# Patient Record
(demographics unavailable — no encounter records)

---

## 2024-11-05 NOTE — CARDIOLOGY SUMMARY
[___] : [unfilled] [LVEF ___%] : LVEF [unfilled]% [___] : [unfilled] [None] : no pulmonary hypertension [Normal] : normal LA size [Mild] : mild mitral regurgitation [de-identified] : November 10, 2021.  Normal sinus rhythm.  Nonspecific T wave changes. May 5, 2023.  Normal sinus rhythm poor R wave progression nonspecific T wave change may14 2024.  Normal sinus rhythm.  Poor R wave progression.  Nonspecific T wave changes. [de-identified] : September 24, 2020.  EF 65 to 70% mild mitral regurgitation.  LVOT mean gradient 8.  Mild tricuspid regurgitation.  RVSP 27 mmHg. November 7, 2023.  Hyperdynamic/preserved EF.  Grade 2 diastolic dysfunction though difficult evaluation in presence of technically difficult study.  Mild MR TR.  PASP 37 mmHg. [de-identified] : Medtronic dual-chamber pacemaker.  Evaluated 11/5/24 .  Normal functioning.  Brief episodes of atrial arrhythmias.  Asymptomatic.

## 2024-11-05 NOTE — HISTORY OF PRESENT ILLNESS
[FreeTextEntry1] : active medical problems: \par  [place] [facility] 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 She does have coronary calcification, and no significant pericardial thickening noted on the prior CT scan in December of 2017\par  \par  Subsequent evaluation, which included cardiac MRI.\par  Pulmonary function tests did not reveal any significant cardiopulmonary abnormality\par  She had multiple cardiovascular tests to look for etiologies in relation to restrictive physiology, which were not showing any significant abnormality.\par  Her BNP level was normal.\par  \par  Essential hypertension. Chronic ankle edema. Significant postural dizziness, which has gotten better with decreased dose of antihypertensive therapy.\par  \par  Atrial arrhythmia, sequential PACs, lasting for less than 3 seconds.  Brief episode of possible atrial fibrillation. There was a small supraventricular hernia. Sinus pauses. Status post dual-chamber permanent pacemaker 2019 now on anticoagulation.  No significant toxicity so far.\par  \par  Hyperlipidemia.\par  \par  Prior history of possible renal artery stenosis with intervention 10 years ago.\par  \par  \par

## 2024-11-05 NOTE — PROCEDURE
[No] : not [Pacemaker] : pacemaker [Lead Imp:  ___ohms] : lead impedance was [unfilled] ohms [Sensing Amplitude ___mv] : sensing amplitude was [unfilled] mv [___V @] : [unfilled] V [___ ms] : [unfilled] ms [de-identified] : Medtronic [de-identified] : Bianka JARVIS DR MRI W1DR01 [de-identified] : [id number] [de-identified] : 9-20-19 [de-identified] : AAIR-DDDR [de-identified] :  [de-identified] : Battery longevity is 8.7 years [de-identified] : AP 66.7%,  19.3%  HVR reviewed and appear to be 1:1 AV conduction, short duration, pt asymptomatic - on Eliquis and Metoprolol  Settings: Atrial Amplitude 1.75V (auto), pulse width 0.4 ms (auto), sensitivity 0.3 mV RV Amplitude 2V (auto), pulse width 0.4 ms (auto), sensitivity 1.2 mV  Patient will be traveling to [place] in January - requesting PPM interrogation and OV with RP in May  Discussed red flag symptoms, which would warrant sooner or emergent medical evaluation. Any questions and concerns were addressed and resolved. Discuss remote monitoring further at next office visit  Sincerely,  Monet Bess PA-C Patients history, testing and plan reviewed with supervising MD: Dr. Irineo Parker

## 2024-11-05 NOTE — ASSESSMENT
[FreeTextEntry1] : Reviewed on November 5, 2024. Labs from early part of 2024 had shown stable CBC BMP.  N-terminal proBNP 183

## 2024-11-05 NOTE — DISCUSSION/SUMMARY
[FreeTextEntry1] : 84-year-old female with above medical history an active medical problems as noted below #1 supraventricular tachycardia, paroxysmal atrial fibrillation, brief episodes.  Relatively asymptomatic.  pacemaker evaluation reviewed.  She has Medtronic dual-chamber. No syncopal event Eliquis 5 mg twice daily .  High risk medication.  No toxicity.  Risk benefits have been discussed at length. Aspirin was discontinued Watch for bleeding.   Continue beta-blockers. Labs ordered. #2 sleep apnea. Severe. Obstructive. CPAP used to be continued.  She feels well with that.  She needs to use it regularly. #3 hiatus hernia, small frequent meals. gradually increasing activity and exercise. She continued to remain fairly active.  avoid bending at the waist, standing for prolonged period of time. #4 Essential hypertension well controlled. No orthostasis.  Occasional mild postural dizziness.  No significant signs of volume overload.  No significant CKD.  She probably does have LV diastolic dysfunction with heart failure of preserved EF. Continue hydration.  And as needed furosemide 20 mg  for any worsening edema #5 Hyperlipidemia on statin therapy.  Following a low-cholesterol diet.  Labs stable.  Continue to follow-up. #6 possible Pericardial restriction.  Overall improved symptoms.  History of exertional shortness of breath, dizziness and fatigue.  status post cardiac cath with normal coronary arteries.  Last CT scan without significant evidence of pericardial disease on that CT scan. She has a preserved ejection fraction. And nonobstructive carotid atherosclerotic vascular disease, and no significant aneurysm on abdominal aortic ultrasound. Her bony function tests were normal. Her cardiac MRI did not reveal any significant structural cardiac disease, mild mitral regurgitation, but showed large hiatus hernia.   Red flag symptoms which would warrant sooner emergent evaluation reviewed with the patient.  Questions and concerns were addressed and answered.   All the above were at length reviewed. Answered all the questions. Thank you very much for this kind referral. Please do not hesitate to give me a call for any question. Part of this transcription was done with voice recognition software and phonetically similar errors are common. I apologize for that. Please donot hesitate to call for any questions due to above.  Sincerely, Irineo Parker MD,[facility]

## 2024-11-05 NOTE — PHYSICAL EXAM
[Normal S1, S2] : normal S1, S2 [Murmur] : murmur [Clear Lung Fields] : clear lung fields [Edema ___] : edema [unfilled] [Normal Speech] : normal speech [Alert and Oriented] : alert and oriented

## 2024-11-05 NOTE — PROCEDURE
[No] : not [Pacemaker] : pacemaker [Lead Imp:  ___ohms] : lead impedance was [unfilled] ohms [Sensing Amplitude ___mv] : sensing amplitude was [unfilled] mv [___V @] : [unfilled] V [___ ms] : [unfilled] ms [de-identified] : Medtronic [de-identified] : Bianka JARVIS DR MRI W1DR01 [de-identified] : [id number] [de-identified] : 9-20-19 [de-identified] : AAIR-DDDR [de-identified] :  [de-identified] : Battery longevity is 8.7 years [de-identified] : AP 66.7%,  19.3%  HVR reviewed and appear to be 1:1 AV conduction, short duration, pt asymptomatic - on Eliquis and Metoprolol  Settings: Atrial Amplitude 1.75V (auto), pulse width 0.4 ms (auto), sensitivity 0.3 mV RV Amplitude 2V (auto), pulse width 0.4 ms (auto), sensitivity 1.2 mV  Patient will be traveling to [place] in January - requesting PPM interrogation and OV with RP in May  Discussed red flag symptoms, which would warrant sooner or emergent medical evaluation. Any questions and concerns were addressed and resolved. Discuss remote monitoring further at next office visit  Sincerely,  Monet Bess PA-C Patients history, testing and plan reviewed with supervising MD: Dr. Irineo Parker

## 2024-11-05 NOTE — REVIEW OF SYSTEMS
[SOB] : shortness of breath [Dyspnea on exertion] : dyspnea during exertion [Lower Ext Edema] : lower extremity edema [Joint Pain] : joint pain [Joint Stiffness] : joint stiffness [Numbness (Hypoesthesia)] : numbness [Negative] : Heme/Lymph [FreeTextEntry2] : Obesity [FreeTextEntry5] : As per HPI [FreeTextEntry9] : As per HPI

## 2024-11-05 NOTE — REASON FOR VISIT
[Symptom and Test Evaluation] : symptom and test evaluation [Arrhythmia/ECG Abnorrmalities] : arrhythmia/ECG abnormalities [Hyperlipidemia] : hyperlipidemia [Hypertension] : hypertension [Other: ____] : [unfilled] [Family Member] : family member [FreeTextEntry3] : Dr. Regalado [FreeTextEntry1] : 84-year-old is seen in the office for clinical follow-up.  Overall she has been doing fairly well. dyspnea on exertion.  Mild.  Usually in cold and warm environment.  She is still being able to play 18 holes of golf without any significant problems.  She has problems with the hand.   and possible tendon entrapment.  Mild intermittent ecchymosis No chest pain.  No PND orthopnea.  No significant worsening of her shortness of breath.  Exertional shortness of breath has remained stable.  Activity level is slightly limited because of the knee surgery and arthritis.  She has been stable with the use of CPAP. No further episode of epistaxis No recent hospital admission.

## 2025-04-02 NOTE — HISTORY OF PRESENT ILLNESS
[FreeTextEntry1] : active medical problems: \par  cardiac cath with Dr. Hoyos at [facility] [place]. 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 She does have coronary calcification, and no significant pericardial thickening noted on the prior CT scan in December of 2017\par  \par  Subsequent evaluation, which included cardiac MRI.\par  Pulmonary function tests did not reveal any significant cardiopulmonary abnormality\par  She had multiple cardiovascular tests to look for etiologies in relation to restrictive physiology, which were not showing any significant abnormality.\par  Her BNP level was normal.\par  \par  Essential hypertension. Chronic ankle edema. Significant postural dizziness, which has gotten better with decreased dose of antihypertensive therapy.\par  \par  Atrial arrhythmia, sequential PACs, lasting for less than 3 seconds.  Brief episode of possible atrial fibrillation. There was a small supraventricular hernia. Sinus pauses. Status post dual-chamber permanent pacemaker 2019 now on anticoagulation.  No significant toxicity so far.\par  \par  Hyperlipidemia.\par  \par  Prior history of possible renal artery stenosis with intervention 10 years ago.\par  \par  \par

## 2025-04-02 NOTE — REASON FOR VISIT
[Symptom and Test Evaluation] : symptom and test evaluation [Arrhythmia/ECG Abnorrmalities] : arrhythmia/ECG abnormalities [Hyperlipidemia] : hyperlipidemia [Hypertension] : hypertension [Other: ____] : [unfilled] [Family Member] : family member [FreeTextEntry3] : Dr. Regalado [FreeTextEntry1] : 84-year-old is seen in the office for clinical follow-up.  Overall she has been doing fairly well. dyspnea on exertion.  Mild.  Usually in cold and warm environment.  She is still being able to play 18 holes of golf without any significant problems.  She has problems with the hand.   and possible tendon entrapment.  Mild intermittent ecchymosis No chest pain.  No PND orthopnea.  No significant worsening of her shortness of breath.  Exertional shortness of breath has remained stable.  Activity level is slightly limited because of the knee surgery and arthritis.  She has been stable with the use of CPAP. No further episode of epistaxis No recent hospital admission.  [Follow-up Device Check] : is here today for a follow-up device check visit for

## 2025-04-02 NOTE — DISCUSSION/SUMMARY
[FreeTextEntry1] : ALICIA BRITTON  is a 84 year F  who presents today Apr 02, 2025 with the above history and the following active issues.   supraventricular tachycardia, paroxysmal atrial fibrillation, brief episodes.  Relatively asymptomatic.  pacemaker evaluation reviewed.  She has Medtronic dual-chamber. No syncopal event Eliquis 5 mg twice daily .  High risk medication.  No toxicity.  Risk benefits have been discussed at length. Aspirin was discontinued Watch for bleeding.   Continue beta-blockers.  Sleep apnea. Severe. Obstructive. CPAP used to be continued.  She feels well with that.  She needs to use it regularly.   Essential hypertension well controlled. No orthostasis.  Occasional mild postural dizziness.  No significant signs of volume overload.  No significant CKD.  She probably does have LV diastolic dysfunction with heart failure of preserved EF. Continue hydration.  And as needed furosemide 20 mg  for any worsening edema  Hyperlipidemia on statin therapy.  Following a low-cholesterol diet.  Labs stable.  Continue to follow-up.  Red flag symptoms which would warrant sooner emergent evaluation reviewed with the patient.  Questions and concerns were addressed and answered.

## 2025-04-02 NOTE — PROCEDURE
[No] : not [Pacemaker] : pacemaker [Lead Imp:  ___ohms] : lead impedance was [unfilled] ohms [Sensing Amplitude ___mv] : sensing amplitude was [unfilled] mv [___V @] : [unfilled] V [___ ms] : [unfilled] ms [de-identified] : Medtronic [de-identified] : Bianka JARVIS DR MRI W1DR01 [de-identified] : [id number] [de-identified] : 9-20-19 [de-identified] : AAIR-DDDR [de-identified] :  [de-identified] : Battery longevity is 8.7 years [de-identified] : AP 58.4%,  16.7%  HVR reviewed and appear to be 1:1 AV conduction, short duration, pt asymptomatic - on Eliquis and Metoprolol  Settings: Atrial Amplitude 1.5V (auto), pulse width 0.4 ms (auto), sensitivity 0.3 mV RV Amplitude 2V (auto), pulse width 0.4 ms (auto), sensitivity 1.2 mV  Several weeks ago sustained an episode of "falling ?passing out". Initially did not seek medical attention. Then due to not feeling well with HA and brain fog she was evaluated in ER. As per pt testing was normal and no acute findings. Saw PCP yesterday who started a course of abx for sinus infection. Awaiting neurology evaluation.   Discussed red flag symptoms, which would warrant sooner or emergent medical evaluation. Any questions and concerns were addressed and resolved. Clinical follow up with Dr. Parker  in 1 month Recommend she follow with neurology and pulmonary  Sincerely,  Monet Bess PA-C Patients history, testing and plan reviewed with supervising MD: Dr. Valentino  Sincerely,  Monet Bess PA-C Patients history, testing and plan reviewed with supervising MD: Dr. Irineo Parker

## 2025-04-02 NOTE — HISTORY OF PRESENT ILLNESS
[FreeTextEntry1] : active medical problems: \par  [place] [facility] 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 She does have coronary calcification, and no significant pericardial thickening noted on the prior CT scan in December of 2017\par  \par  Subsequent evaluation, which included cardiac MRI.\par  Pulmonary function tests did not reveal any significant cardiopulmonary abnormality\par  She had multiple cardiovascular tests to look for etiologies in relation to restrictive physiology, which were not showing any significant abnormality.\par  Her BNP level was normal.\par  \par  Essential hypertension. Chronic ankle edema. Significant postural dizziness, which has gotten better with decreased dose of antihypertensive therapy.\par  \par  Atrial arrhythmia, sequential PACs, lasting for less than 3 seconds.  Brief episode of possible atrial fibrillation. There was a small supraventricular hernia. Sinus pauses. Status post dual-chamber permanent pacemaker 2019 now on anticoagulation.  No significant toxicity so far.\par  \par  Hyperlipidemia.\par  \par  Prior history of possible renal artery stenosis with intervention 10 years ago.\par  \par  \par

## 2025-04-02 NOTE — PROCEDURE
[No] : not [Pacemaker] : pacemaker [Lead Imp:  ___ohms] : lead impedance was [unfilled] ohms [Sensing Amplitude ___mv] : sensing amplitude was [unfilled] mv [___V @] : [unfilled] V [___ ms] : [unfilled] ms [de-identified] : Medtronic [de-identified] : Bianka JARVIS DR MRI W1DR01 [de-identified] : [id number] [de-identified] : 9-20-19 [de-identified] : AAIR-DDDR [de-identified] :  [de-identified] : Battery longevity is 8.7 years [de-identified] : AP 58.4%,  16.7%  HVR reviewed and appear to be 1:1 AV conduction, short duration, pt asymptomatic - on Eliquis and Metoprolol  Settings: Atrial Amplitude 1.5V (auto), pulse width 0.4 ms (auto), sensitivity 0.3 mV RV Amplitude 2V (auto), pulse width 0.4 ms (auto), sensitivity 1.2 mV  Several weeks ago sustained an episode of "falling ?passing out". Initially did not seek medical attention. Then due to not feeling well with HA and brain fog she was evaluated in ER. As per pt testing was normal and no acute findings. Saw PCP yesterday who started a course of abx for sinus infection. Awaiting neurology evaluation.   Discussed red flag symptoms, which would warrant sooner or emergent medical evaluation. Any questions and concerns were addressed and resolved. Clinical follow up with Dr. Parker  in 1 month Recommend she follow with neurology and pulmonary  Sincerely,  Monet Bess PA-C Patients history, testing and plan reviewed with supervising MD: Dr. Valentino  Sincerely,  Monet Bess PA-C Patients history, testing and plan reviewed with supervising MD: Dr. Irineo Parker

## 2025-04-02 NOTE — CARDIOLOGY SUMMARY
[___] : [unfilled] [LVEF ___%] : LVEF [unfilled]% [None] : no pulmonary hypertension [Normal] : normal LA size [Mild] : mild mitral regurgitation [de-identified] : November 10, 2021.  Normal sinus rhythm.  Nonspecific T wave changes. May 5, 2023.  Normal sinus rhythm poor R wave progression nonspecific T wave change may14 2024.  Normal sinus rhythm.  Poor R wave progression.  Nonspecific T wave changes. [de-identified] : September 24, 2020.  EF 65 to 70% mild mitral regurgitation.  LVOT mean gradient 8.  Mild tricuspid regurgitation.  RVSP 27 mmHg. November 7, 2023.  Hyperdynamic/preserved EF.  Grade 2 diastolic dysfunction though difficult evaluation in presence of technically difficult study.  Mild MR TR.  PASP 37 mmHg. [de-identified] : Medtronic dual-chamber pacemaker.  Evaluated 11/5/24 .  Normal functioning.  Brief episodes of atrial arrhythmias.  Asymptomatic.

## 2025-04-02 NOTE — CARDIOLOGY SUMMARY
[___] : [unfilled] [LVEF ___%] : LVEF [unfilled]% [None] : no pulmonary hypertension [Normal] : normal LA size [Mild] : mild mitral regurgitation [de-identified] : November 10, 2021.  Normal sinus rhythm.  Nonspecific T wave changes. May 5, 2023.  Normal sinus rhythm poor R wave progression nonspecific T wave change may14 2024.  Normal sinus rhythm.  Poor R wave progression.  Nonspecific T wave changes. [de-identified] : September 24, 2020.  EF 65 to 70% mild mitral regurgitation.  LVOT mean gradient 8.  Mild tricuspid regurgitation.  RVSP 27 mmHg. November 7, 2023.  Hyperdynamic/preserved EF.  Grade 2 diastolic dysfunction though difficult evaluation in presence of technically difficult study.  Mild MR TR.  PASP 37 mmHg. [de-identified] : Medtronic dual-chamber pacemaker.  Evaluated 11/5/24 .  Normal functioning.  Brief episodes of atrial arrhythmias.  Asymptomatic.

## 2025-06-09 NOTE — CARDIOLOGY SUMMARY
[___] : [unfilled] [LVEF ___%] : LVEF [unfilled]% [None] : no pulmonary hypertension [Normal] : normal LA size [Mild] : mild mitral regurgitation [de-identified] : November 10, 2021.  Normal sinus rhythm.  Nonspecific T wave changes. May 5, 2023.  Normal sinus rhythm poor R wave progression nonspecific T wave change may14 2024.  Normal sinus rhythm.  Poor R wave progression.  Nonspecific T wave changes. 6/9/25 nsr  [de-identified] : September 24, 2020.  EF 65 to 70% mild mitral regurgitation.  LVOT mean gradient 8.  Mild tricuspid regurgitation.  RVSP 27 mmHg. November 7, 2023.  Hyperdynamic/preserved EF.  Grade 2 diastolic dysfunction though difficult evaluation in presence of technically difficult study.  Mild MR TR.  PASP 37 mmHg. [de-identified] : Medtronic dual-chamber pacemaker.  Evaluated 11/5/24 .  Normal functioning.  Brief episodes of atrial arrhythmias.  Asymptomatic.

## 2025-06-09 NOTE — HISTORY OF PRESENT ILLNESS
[FreeTextEntry1] : active medical problems: \par  cardiac cath with Dr. Hoyos at [facility] [place]. 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 She does have coronary calcification, and no significant pericardial thickening noted on the prior CT scan in December of 2017\par  \par  Subsequent evaluation, which included cardiac MRI.\par  Pulmonary function tests did not reveal any significant cardiopulmonary abnormality\par  She had multiple cardiovascular tests to look for etiologies in relation to restrictive physiology, which were not showing any significant abnormality.\par  Her BNP level was normal.\par  \par  Essential hypertension. Chronic ankle edema. Significant postural dizziness, which has gotten better with decreased dose of antihypertensive therapy.\par  \par  Atrial arrhythmia, sequential PACs, lasting for less than 3 seconds.  Brief episode of possible atrial fibrillation. There was a small supraventricular hernia. Sinus pauses. Status post dual-chamber permanent pacemaker 2019 now on anticoagulation.  No significant toxicity so far.\par  \par  Hyperlipidemia.\par  \par  Prior history of possible renal artery stenosis with intervention 10 years ago.\par  \par  \par

## 2025-06-09 NOTE — PHYSICAL EXAM
[No Acute Distress] : no acute distress [Normal Venous Pressure] : normal venous pressure [Normal S1, S2] : normal S1, S2 [Murmur] : murmur [Soft] : abdomen soft [No Edema] : no edema [Normal Speech] : normal speech [Alert and Oriented] : alert and oriented [de-identified] : nop rthostatic shifts

## 2025-06-09 NOTE — PHYSICAL EXAM
[No Acute Distress] : no acute distress [Normal Venous Pressure] : normal venous pressure [Normal S1, S2] : normal S1, S2 [Murmur] : murmur [Soft] : abdomen soft [No Edema] : no edema [Normal Speech] : normal speech [Alert and Oriented] : alert and oriented [de-identified] : nop rthostatic shifts

## 2025-06-09 NOTE — ASSESSMENT
[FreeTextEntry1] : Reviewed on November 5, 2024. Labs from early part of 2024 had shown stable CBC BMP.  N-terminal proBNP 183  reviewed 6/09/25 ekg reviewed

## 2025-06-09 NOTE — DISCUSSION/SUMMARY
[EKG obtained to assist in diagnosis and management of assessed problem(s)] : EKG obtained to assist in diagnosis and management of assessed problem(s) [FreeTextEntry1] : 84-year-old female with above medical history an active medical problems as noted below  ### fatigue, headache, fall  low BP  no orthostasis  ekg stable  PPM function normal in april no significant new arrhythmia besides brief PAF/PSVT.  rec;  ct scan of head  labs - cbc, cmp, tsh , bnp, esr, am cortosiol  hydration  PMD f/u   #1 supraventricular tachycardia, paroxysmal atrial fibrillation, brief episodes.  Relatively asymptomatic.  pacemaker evaluation reviewed.  She has Medtronic dual-chamber. No syncopal event Eliquis 5 mg twice daily .  High risk medication.  No toxicity.  Risk benefits have been discussed at length. Aspirin was discontinued Watch for bleeding.   Continue beta-blockers. #2 sleep apnea. Severe. Obstructive. stopped . recommended to go back. #3 hiatus hernia, small frequent meals. gradually increasing activity and exercise. She continued to remain fairly active.  avoid bending at the waist, standing for prolonged period of time. #4 Essential hypertension well controlled. low. No orthostasis.  Occasional mild postural dizziness.  No significant signs of volume overload.  No significant CKD.  She probably does have LV diastolic dysfunction with heart failure of preserved EF. Continue hydration.  And as needed furosemide 20 mg  for any worsening edema #5 Hyperlipidemia on statin therapy.  Following a low-cholesterol diet.  Labs stable.  Continue to follow-up. #6 possible Pericardial restriction.  Overall improved symptoms.  History of exertional shortness of breath, dizziness and fatigue.  status post cardiac cath with normal coronary arteries.  Last CT scan without significant evidence of pericardial disease on that CT scan. She has a preserved ejection fraction. And nonobstructive carotid atherosclerotic vascular disease, and no significant aneurysm on abdominal aortic ultrasound. Her bony function tests were normal. Her cardiac MRI did not reveal any significant structural cardiac disease, mild mitral regurgitation, but showed large hiatus hernia.   Red flag symptoms which would warrant sooner emergent evaluation reviewed with the patient.  Questions and concerns were addressed and answered.   All the above were at length reviewed. Answered all the questions. Thank you very much for this kind referral. Please do not hesitate to give me a call for any question. Part of this transcription was done with voice recognition software and phonetically similar errors are common. I apologize for that. Please donot hesitate to call for any questions due to above.  Sincerely, Irineo Parker MD,FAC,SB

## 2025-06-09 NOTE — HISTORY OF PRESENT ILLNESS
[FreeTextEntry1] : active medical problems: \par  cardiac cath with Dr. Hoyos at [facility] [place]. 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 She does have coronary calcification, and no significant pericardial thickening noted on the prior CT scan in December of 2017\par  \par  Subsequent evaluation, which included cardiac MRI.\par  Pulmonary function tests did not reveal any significant cardiopulmonary abnormality\par  She had multiple cardiovascular tests to look for etiologies in relation to restrictive physiology, which were not showing any significant abnormality.\par  Her BNP level was normal.\par  \par  Essential hypertension. Chronic ankle edema. Significant postural dizziness, which has gotten better with decreased dose of antihypertensive therapy.\par  \par  Atrial arrhythmia, sequential PACs, lasting for less than 3 seconds.  Brief episode of possible atrial fibrillation. There was a small supraventricular hernia. Sinus pauses. Status post dual-chamber permanent pacemaker 2019 now on anticoagulation.  No significant toxicity so far.\par  \par  Hyperlipidemia.\par  \par  Prior history of possible renal artery stenosis with intervention 10 years ago.\par  \par  \par

## 2025-06-09 NOTE — CARDIOLOGY SUMMARY
[___] : [unfilled] [LVEF ___%] : LVEF [unfilled]% [None] : no pulmonary hypertension [Normal] : normal LA size [Mild] : mild mitral regurgitation [de-identified] : November 10, 2021.  Normal sinus rhythm.  Nonspecific T wave changes. May 5, 2023.  Normal sinus rhythm poor R wave progression nonspecific T wave change may14 2024.  Normal sinus rhythm.  Poor R wave progression.  Nonspecific T wave changes. 6/9/25 nsr  [de-identified] : September 24, 2020.  EF 65 to 70% mild mitral regurgitation.  LVOT mean gradient 8.  Mild tricuspid regurgitation.  RVSP 27 mmHg. November 7, 2023.  Hyperdynamic/preserved EF.  Grade 2 diastolic dysfunction though difficult evaluation in presence of technically difficult study.  Mild MR TR.  PASP 37 mmHg. [de-identified] : Medtronic dual-chamber pacemaker.  Evaluated 11/5/24 .  Normal functioning.  Brief episodes of atrial arrhythmias.  Asymptomatic.

## 2025-06-09 NOTE — REVIEW OF SYSTEMS
[SOB] : shortness of breath [Dyspnea on exertion] : dyspnea during exertion [Lower Ext Edema] : lower extremity edema [Joint Pain] : joint pain [Joint Stiffness] : joint stiffness [Numbness (Hypoesthesia)] : numbness [Negative] : Heme/Lymph [Feeling Fatigued] : feeling fatigued [Headache] : headache [FreeTextEntry2] : Obesity [FreeTextEntry5] : As per HPI [FreeTextEntry9] : As per HPI

## 2025-06-09 NOTE — REASON FOR VISIT
[Symptom and Test Evaluation] : symptom and test evaluation [Arrhythmia/ECG Abnorrmalities] : arrhythmia/ECG abnormalities [Hyperlipidemia] : hyperlipidemia [Hypertension] : hypertension [Other: ____] : [unfilled] [Family Member] : family member [FreeTextEntry3] : Dr. Regalado [FreeTextEntry1] : 84-year-old is seen in the office for clinical follow-up.  recent fall and ER visit in [place].  concussion.  she thinks she slipped and fell.  now significant fatigue and headache. dyspnea on exertion.  Mild.  Usually in cold and warm environment.  She is still being able to play 18 holes of golf without any significant problems.  She has problems with the hand.   and possible tendon entrapment.  Mild intermittent ecchymosis No chest pain.  No PND orthopnea.  No significant worsening of her shortness of breath.  Exertional shortness of breath has remained stable.  Activity level is slightly limited because of the knee surgery and arthritis.  She has been stable with the use of CPAP. No further episode of epistaxis No recent hospital admission.